# Patient Record
Sex: MALE | Race: WHITE | Employment: FULL TIME | URBAN - METROPOLITAN AREA
[De-identification: names, ages, dates, MRNs, and addresses within clinical notes are randomized per-mention and may not be internally consistent; named-entity substitution may affect disease eponyms.]

---

## 2017-08-01 ENCOUNTER — OFFICE VISIT (OUTPATIENT)
Dept: SURGERY | Facility: CLINIC | Age: 37
End: 2017-08-01

## 2017-08-01 ENCOUNTER — TELEPHONE (OUTPATIENT)
Dept: SURGERY | Facility: CLINIC | Age: 37
End: 2017-08-01

## 2017-08-01 VITALS
SYSTOLIC BLOOD PRESSURE: 130 MMHG | BODY MASS INDEX: 28.88 KG/M2 | HEIGHT: 74 IN | TEMPERATURE: 98 F | DIASTOLIC BLOOD PRESSURE: 84 MMHG | WEIGHT: 225 LBS | HEART RATE: 80 BPM

## 2017-08-01 DIAGNOSIS — Z30.09 VASECTOMY EVALUATION: Primary | ICD-10-CM

## 2017-08-01 DIAGNOSIS — R35.1 NOCTURIA: ICD-10-CM

## 2017-08-01 PROCEDURE — 99204 OFFICE O/P NEW MOD 45 MIN: CPT | Performed by: UROLOGY

## 2017-08-01 PROCEDURE — 99213 OFFICE O/P EST LOW 20 MIN: CPT | Performed by: UROLOGY

## 2017-08-01 RX ORDER — DIAZEPAM 5 MG/1
TABLET ORAL
Qty: 3 TABLET | Refills: 0 | Status: SHIPPED | OUTPATIENT
Start: 2017-08-01

## 2017-08-01 RX ORDER — CEFADROXIL 500 MG/1
CAPSULE ORAL
Qty: 10 CAPSULE | Refills: 0 | Status: SHIPPED | OUTPATIENT
Start: 2017-08-01

## 2017-08-01 RX ORDER — HYDROCODONE BITARTRATE AND ACETAMINOPHEN 5; 325 MG/1; MG/1
TABLET ORAL
Qty: 20 TABLET | Refills: 0 | Status: SHIPPED | OUTPATIENT
Start: 2017-08-01

## 2017-08-01 NOTE — TELEPHONE ENCOUNTER
Patient requesting surgery for vasectomy be performed at surgery center. Informed patient that Tucker Diaz, surgery scheduler, will check with insurance and if approved, will call patient to schedule.  Informed patient that if not approved at surgery center that

## 2017-08-01 NOTE — PATIENT INSTRUCTIONS
1.  Please continue  birth control precautions without exception, every time you have intercourse,  until further notice    2. Proceed with plans for voluntary sterilization -- bilateral vasectomy     3.   NO aspirin or NSAIDs (medications such as Advil, [facility],    please take the diazepam and hydrocodone 30 minutes before the actual start time of the procedure and not necessarily when you arrive at the surgery center.           12.  Lab tests in the near future   --urinalysis urine test tomorrow or sometim

## 2017-08-01 NOTE — PROGRESS NOTES
Delilah Zayas is a 43year old male. HPI:   History provided by pt. [facility] [place]     Patient is 43years old  and his wife is   39years old . They have 2 boys, 3and 11years old . This is the  only marriage for both of them. Quit date: 6/15/2006  Smokeless tobacco: Former User                     Alcohol use: Yes           0.0 oz/week     Comment: rarely       Medications (Active prior to today's visit):    Current Outpatient Prescriptions:  Cefadroxil 500 MG Oral Cap Oriented to time, place, person with normal affect  Exam appropriate for age; patellar reflexes 1/2 + bilaterally, symmetrical    Head/Face: normocephalic  Eyes/Vision: no scleral icterus  Neck/Thyroid: neck is supple without adenopathy  Lymphatic: no abno changing the national policy on vasectomy. The American U[employer] Association has come to the same conclusion.  I explained to him and he understands that he will be fertile immediately after the operation and could make his wife pregnant and has to bring i might be very difficult and may not be successful. I explained to him and he understands that complications such as wound infection and bleeding into the scrotum and longstanding postoperative testicular pain can occur lasting for years.   I have explained to use birth control precautions  at this point, and  also after the bilateral vasectomy until we tell him that it is safe to stop doing so. He understands and agrees.            2)  Prostate cancer screening/PSA screening  --   I strongly recommend to pat morning of the procedure; you may have them after the procedure    8. Please eat breakfast or lunch before the procedure because we will not be putting you to sleep, because otherwise the medications may cause nausea.     9.  no heavy lifting or strenuous procedure; you must have a . HYDROcodone-acetaminophen 5-325 MG Oral Tab 20 tablet 0      Sig: Take 2 tablets by mouth  WHEN YOU ARRIVE AT THE OFFICE   for your procedure; you must have a .   After the procedure, take 1 tablet every 4-6 raheel

## 2017-08-11 ENCOUNTER — APPOINTMENT (OUTPATIENT)
Dept: LAB | Facility: HOSPITAL | Age: 37
End: 2017-08-11
Attending: UROLOGY
Payer: COMMERCIAL

## 2017-08-11 DIAGNOSIS — R35.1 NOCTURIA: ICD-10-CM

## 2017-08-11 LAB
BILIRUB UR QL: NEGATIVE
CLARITY UR: CLEAR
COLOR UR: YELLOW
GLUCOSE UR-MCNC: NEGATIVE MG/DL
HGB UR QL STRIP.AUTO: NEGATIVE
KETONES UR-MCNC: NEGATIVE MG/DL
LEUKOCYTE ESTERASE UR QL STRIP.AUTO: NEGATIVE
NITRITE UR QL STRIP.AUTO: NEGATIVE
PH UR: 6 [PH] (ref 5–8)
PROT UR-MCNC: NEGATIVE MG/DL
SP GR UR STRIP: 1.02 (ref 1–1.03)
UROBILINOGEN UR STRIP-ACNC: <2
VIT C UR-MCNC: NEGATIVE MG/DL

## 2017-08-11 PROCEDURE — 81003 URINALYSIS AUTO W/O SCOPE: CPT

## 2021-12-13 ENCOUNTER — IMMUNIZATION (OUTPATIENT)
Dept: LAB | Facility: HOSPITAL | Age: 41
End: 2021-12-13
Attending: EMERGENCY MEDICINE
Payer: COMMERCIAL

## 2021-12-13 DIAGNOSIS — Z23 NEED FOR VACCINATION: Primary | ICD-10-CM

## 2021-12-13 PROCEDURE — 0004A SARSCOV2 VAC 30MCG/0.3ML IM: CPT

## 2023-01-28 ENCOUNTER — OFFICE VISIT (OUTPATIENT)
Dept: FAMILY MEDICINE CLINIC | Facility: CLINIC | Age: 43
End: 2023-01-28
Payer: COMMERCIAL

## 2023-01-28 VITALS
HEART RATE: 92 BPM | WEIGHT: 253.38 LBS | DIASTOLIC BLOOD PRESSURE: 94 MMHG | SYSTOLIC BLOOD PRESSURE: 132 MMHG | HEIGHT: 74 IN | TEMPERATURE: 98 F | OXYGEN SATURATION: 97 % | BODY MASS INDEX: 32.52 KG/M2 | RESPIRATION RATE: 14 BRPM

## 2023-01-28 DIAGNOSIS — J98.01 ACUTE BRONCHOSPASM: Primary | ICD-10-CM

## 2023-01-28 DIAGNOSIS — R03.0 ELEVATED BLOOD PRESSURE READING: ICD-10-CM

## 2023-01-28 LAB
OPERATOR ID: NORMAL
POCT LOT NUMBER: NORMAL
RAPID SARS-COV-2 BY PCR: NOT DETECTED

## 2023-01-28 PROCEDURE — 99214 OFFICE O/P EST MOD 30 MIN: CPT | Performed by: PHYSICIAN ASSISTANT

## 2023-01-28 PROCEDURE — 3008F BODY MASS INDEX DOCD: CPT | Performed by: PHYSICIAN ASSISTANT

## 2023-01-28 PROCEDURE — 94640 AIRWAY INHALATION TREATMENT: CPT | Performed by: PHYSICIAN ASSISTANT

## 2023-01-28 PROCEDURE — U0002 COVID-19 LAB TEST NON-CDC: HCPCS | Performed by: PHYSICIAN ASSISTANT

## 2023-01-28 PROCEDURE — 3075F SYST BP GE 130 - 139MM HG: CPT | Performed by: PHYSICIAN ASSISTANT

## 2023-01-28 PROCEDURE — 3080F DIAST BP >= 90 MM HG: CPT | Performed by: PHYSICIAN ASSISTANT

## 2023-01-28 RX ORDER — ALBUTEROL SULFATE 90 UG/1
2 AEROSOL, METERED RESPIRATORY (INHALATION) EVERY 6 HOURS
Qty: 1 EACH | Refills: 1 | Status: SHIPPED | OUTPATIENT
Start: 2023-01-28

## 2023-01-28 RX ORDER — PREDNISONE 20 MG/1
40 TABLET ORAL DAILY
Qty: 10 TABLET | Refills: 0 | Status: SHIPPED | OUTPATIENT
Start: 2023-01-28 | End: 2023-02-02

## 2023-01-28 RX ORDER — IPRATROPIUM BROMIDE AND ALBUTEROL SULFATE 2.5; .5 MG/3ML; MG/3ML
3 SOLUTION RESPIRATORY (INHALATION) ONCE
Status: COMPLETED | OUTPATIENT
Start: 2023-01-28 | End: 2023-01-28

## 2023-01-28 RX ADMIN — IPRATROPIUM BROMIDE AND ALBUTEROL SULFATE 3 ML: 2.5; .5 SOLUTION RESPIRATORY (INHALATION) at 15:52:00

## 2023-04-01 ENCOUNTER — OFFICE VISIT (OUTPATIENT)
Dept: FAMILY MEDICINE CLINIC | Facility: CLINIC | Age: 43
End: 2023-04-01
Payer: COMMERCIAL

## 2023-04-01 VITALS
HEIGHT: 74 IN | BODY MASS INDEX: 31.91 KG/M2 | RESPIRATION RATE: 16 BRPM | DIASTOLIC BLOOD PRESSURE: 84 MMHG | WEIGHT: 248.63 LBS | HEART RATE: 88 BPM | TEMPERATURE: 98 F | SYSTOLIC BLOOD PRESSURE: 142 MMHG

## 2023-04-01 DIAGNOSIS — J98.01 ACUTE BRONCHOSPASM: Primary | ICD-10-CM

## 2023-04-01 DIAGNOSIS — I10 ESSENTIAL HYPERTENSION: ICD-10-CM

## 2023-04-01 LAB
OPERATOR ID: NORMAL
POCT LOT NUMBER: NORMAL
RAPID SARS-COV-2 BY PCR: NOT DETECTED

## 2023-04-01 PROCEDURE — U0002 COVID-19 LAB TEST NON-CDC: HCPCS | Performed by: PHYSICIAN ASSISTANT

## 2023-04-01 PROCEDURE — 3079F DIAST BP 80-89 MM HG: CPT | Performed by: PHYSICIAN ASSISTANT

## 2023-04-01 PROCEDURE — 3008F BODY MASS INDEX DOCD: CPT | Performed by: PHYSICIAN ASSISTANT

## 2023-04-01 PROCEDURE — 99214 OFFICE O/P EST MOD 30 MIN: CPT | Performed by: PHYSICIAN ASSISTANT

## 2023-04-01 PROCEDURE — 94640 AIRWAY INHALATION TREATMENT: CPT | Performed by: PHYSICIAN ASSISTANT

## 2023-04-01 PROCEDURE — 3077F SYST BP >= 140 MM HG: CPT | Performed by: PHYSICIAN ASSISTANT

## 2023-04-01 RX ORDER — PREDNISONE 20 MG/1
40 TABLET ORAL DAILY
Qty: 10 TABLET | Refills: 0 | Status: SHIPPED | OUTPATIENT
Start: 2023-04-01 | End: 2023-04-06

## 2023-04-01 RX ORDER — IPRATROPIUM BROMIDE AND ALBUTEROL SULFATE 2.5; .5 MG/3ML; MG/3ML
3 SOLUTION RESPIRATORY (INHALATION) ONCE
Status: COMPLETED | OUTPATIENT
Start: 2023-04-01 | End: 2023-04-01

## 2023-04-01 RX ORDER — ALBUTEROL SULFATE 90 UG/1
2 AEROSOL, METERED RESPIRATORY (INHALATION) EVERY 4 HOURS PRN
Qty: 1 EACH | Refills: 0 | Status: SHIPPED | OUTPATIENT
Start: 2023-04-01

## 2023-04-01 RX ADMIN — IPRATROPIUM BROMIDE AND ALBUTEROL SULFATE 3 ML: 2.5; .5 SOLUTION RESPIRATORY (INHALATION) at 10:38:00

## 2023-04-01 NOTE — PATIENT INSTRUCTIONS
Please see your PCP or establish care with an [facility] Provider in the next 2 weeks

## 2023-05-01 ENCOUNTER — OFFICE VISIT (OUTPATIENT)
Dept: INTERNAL MEDICINE CLINIC | Facility: CLINIC | Age: 43
End: 2023-05-01

## 2023-05-01 ENCOUNTER — APPOINTMENT (OUTPATIENT)
Dept: GENERAL RADIOLOGY | Facility: HOSPITAL | Age: 43
End: 2023-05-01
Attending: EMERGENCY MEDICINE
Payer: COMMERCIAL

## 2023-05-01 ENCOUNTER — HOSPITAL ENCOUNTER (EMERGENCY)
Facility: HOSPITAL | Age: 43
Discharge: HOME | End: 2023-05-01
Attending: EMERGENCY MEDICINE
Payer: COMMERCIAL

## 2023-05-01 VITALS
BODY MASS INDEX: 31.83 KG/M2 | DIASTOLIC BLOOD PRESSURE: 90 MMHG | WEIGHT: 248 LBS | SYSTOLIC BLOOD PRESSURE: 147 MMHG | HEART RATE: 90 BPM | RESPIRATION RATE: 18 BRPM | TEMPERATURE: 98 F | OXYGEN SATURATION: 91 % | HEIGHT: 74 IN

## 2023-05-01 VITALS
DIASTOLIC BLOOD PRESSURE: 80 MMHG | HEART RATE: 86 BPM | SYSTOLIC BLOOD PRESSURE: 135 MMHG | BODY MASS INDEX: 31.83 KG/M2 | TEMPERATURE: 97 F | HEIGHT: 74 IN | OXYGEN SATURATION: 94 % | WEIGHT: 248 LBS

## 2023-05-01 DIAGNOSIS — J45.21 MILD INTERMITTENT ASTHMA WITH ACUTE EXACERBATION: Primary | ICD-10-CM

## 2023-05-01 DIAGNOSIS — J45.901 EXACERBATION OF ASTHMA, UNSPECIFIED ASTHMA SEVERITY, UNSPECIFIED WHETHER PERSISTENT: Primary | ICD-10-CM

## 2023-05-01 LAB
ALBUMIN SERPL-MCNC: 4.2 G/DL (ref 3.4–5)
ALBUMIN/GLOB SERPL: 1.1 {RATIO} (ref 1–2)
ALP LIVER SERPL-CCNC: 65 U/L
ALT SERPL-CCNC: 42 U/L
ANION GAP SERPL CALC-SCNC: 10 MMOL/L (ref 0–18)
AST SERPL-CCNC: 29 U/L (ref 15–37)
ATRIAL RATE: 87 BPM
BASOPHILS # BLD AUTO: 0.06 X10(3) UL (ref 0–0.2)
BASOPHILS NFR BLD AUTO: 0.8 %
BILIRUB SERPL-MCNC: 1 MG/DL (ref 0.1–2)
BUN BLD-MCNC: 12 MG/DL (ref 7–18)
BUN/CREAT SERPL: 11.5 (ref 10–20)
CALCIUM BLD-MCNC: 9.3 MG/DL (ref 8.5–10.1)
CHLORIDE SERPL-SCNC: 106 MMOL/L (ref 98–112)
CO2 SERPL-SCNC: 24 MMOL/L (ref 21–32)
CREAT BLD-MCNC: 1.04 MG/DL
DEPRECATED RDW RBC AUTO: 40.6 FL (ref 35.1–46.3)
EOSINOPHIL # BLD AUTO: 0.71 X10(3) UL (ref 0–0.7)
EOSINOPHIL NFR BLD AUTO: 9.1 %
ERYTHROCYTE [DISTWIDTH] IN BLOOD BY AUTOMATED COUNT: 12.5 % (ref 11–15)
GFR SERPLBLD BASED ON 1.73 SQ M-ARVRAT: 91 ML/MIN/1.73M2 (ref 60–?)
GLOBULIN PLAS-MCNC: 3.8 G/DL (ref 2.8–4.4)
GLUCOSE BLD-MCNC: 108 MG/DL (ref 70–99)
HCT VFR BLD AUTO: 48.5 %
HGB BLD-MCNC: 17.2 G/DL
IMM GRANULOCYTES # BLD AUTO: 0.03 X10(3) UL (ref 0–1)
IMM GRANULOCYTES NFR BLD: 0.4 %
LYMPHOCYTES # BLD AUTO: 2.48 X10(3) UL (ref 1–4)
LYMPHOCYTES NFR BLD AUTO: 31.7 %
MCH RBC QN AUTO: 31.3 PG (ref 26–34)
MCHC RBC AUTO-ENTMCNC: 35.5 G/DL (ref 31–37)
MCV RBC AUTO: 88.3 FL
MONOCYTES # BLD AUTO: 0.62 X10(3) UL (ref 0.1–1)
MONOCYTES NFR BLD AUTO: 7.9 %
NEUTROPHILS # BLD AUTO: 3.92 X10 (3) UL (ref 1.5–7.7)
NEUTROPHILS # BLD AUTO: 3.92 X10(3) UL (ref 1.5–7.7)
NEUTROPHILS NFR BLD AUTO: 50.1 %
OSMOLALITY SERPL CALC.SUM OF ELEC: 290 MOSM/KG (ref 275–295)
P AXIS: 65 DEGREES
P-R INTERVAL: 140 MS
PLATELET # BLD AUTO: 297 10(3)UL (ref 150–450)
POTASSIUM SERPL-SCNC: 3.9 MMOL/L (ref 3.5–5.1)
PROT SERPL-MCNC: 8 G/DL (ref 6.4–8.2)
Q-T INTERVAL: 358 MS
QRS DURATION: 64 MS
QTC CALCULATION (BEZET): 430 MS
R AXIS: 8 DEGREES
RBC # BLD AUTO: 5.49 X10(6)UL
SODIUM SERPL-SCNC: 140 MMOL/L (ref 136–145)
T AXIS: 54 DEGREES
VENTRICULAR RATE: 87 BPM
WBC # BLD AUTO: 7.8 X10(3) UL (ref 4–11)

## 2023-05-01 PROCEDURE — 93005 ELECTROCARDIOGRAM TRACING: CPT

## 2023-05-01 PROCEDURE — 99284 EMERGENCY DEPT VISIT MOD MDM: CPT

## 2023-05-01 PROCEDURE — 99214 OFFICE O/P EST MOD 30 MIN: CPT | Performed by: INTERNAL MEDICINE

## 2023-05-01 PROCEDURE — 80053 COMPREHEN METABOLIC PANEL: CPT | Performed by: EMERGENCY MEDICINE

## 2023-05-01 PROCEDURE — 36415 COLL VENOUS BLD VENIPUNCTURE: CPT

## 2023-05-01 PROCEDURE — 85025 COMPLETE CBC W/AUTO DIFF WBC: CPT

## 2023-05-01 PROCEDURE — 99285 EMERGENCY DEPT VISIT HI MDM: CPT

## 2023-05-01 PROCEDURE — 71045 X-RAY EXAM CHEST 1 VIEW: CPT | Performed by: EMERGENCY MEDICINE

## 2023-05-01 PROCEDURE — 93010 ELECTROCARDIOGRAM REPORT: CPT

## 2023-05-01 PROCEDURE — 80053 COMPREHEN METABOLIC PANEL: CPT

## 2023-05-01 PROCEDURE — 85025 COMPLETE CBC W/AUTO DIFF WBC: CPT | Performed by: EMERGENCY MEDICINE

## 2023-05-01 PROCEDURE — 94640 AIRWAY INHALATION TREATMENT: CPT

## 2023-05-01 RX ORDER — PREDNISONE 20 MG/1
TABLET ORAL
Qty: 12 TABLET | Refills: 0 | Status: SHIPPED | OUTPATIENT
Start: 2023-05-01

## 2023-05-01 RX ORDER — ALBUTEROL SULFATE 2.5 MG/3ML
SOLUTION RESPIRATORY (INHALATION)
Status: COMPLETED
Start: 2023-05-01 | End: 2023-05-01

## 2023-05-01 RX ORDER — PREDNISONE 20 MG/1
60 TABLET ORAL ONCE
Status: COMPLETED | OUTPATIENT
Start: 2023-05-01 | End: 2023-05-01

## 2023-05-01 RX ORDER — IPRATROPIUM BROMIDE AND ALBUTEROL SULFATE 2.5; .5 MG/3ML; MG/3ML
3 SOLUTION RESPIRATORY (INHALATION) ONCE AS NEEDED
Status: DISCONTINUED | OUTPATIENT
Start: 2023-05-01 | End: 2023-05-01

## 2023-05-01 RX ORDER — ALBUTEROL SULFATE 2.5 MG/3ML
2.5 SOLUTION RESPIRATORY (INHALATION) ONCE
Status: COMPLETED | OUTPATIENT
Start: 2023-05-01 | End: 2023-05-01

## 2023-05-01 RX ORDER — METHYLPREDNISOLONE SODIUM SUCCINATE 125 MG/2ML
125 INJECTION, POWDER, LYOPHILIZED, FOR SOLUTION INTRAMUSCULAR; INTRAVENOUS ONCE AS NEEDED
Status: DISCONTINUED | OUTPATIENT
Start: 2023-05-01 | End: 2023-05-01

## 2023-05-01 RX ORDER — ALBUTEROL SULFATE 90 UG/1
2 AEROSOL, METERED RESPIRATORY (INHALATION) EVERY 4 HOURS PRN
Qty: 1 EACH | Refills: 0 | Status: SHIPPED | OUTPATIENT
Start: 2023-05-01 | End: 2023-05-31

## 2023-05-01 NOTE — ED QUICK NOTES
Pt arrived via triage for ANNIA since yesterday  Reports known asthma exacerbation and recent nonproductive cough

## 2023-05-01 NOTE — DISCHARGE INSTRUCTIONS
Take prednisone as prescribed beginning tomorrow (5/2). Continue using your inhaler as needed. Follow-up with your primary physician for reevaluation.   Return to the emergency department if increasing difficulty breathing, fever, or other new symptoms develop

## 2023-05-01 NOTE — ED INITIAL ASSESSMENT (HPI)
Pt ambulatory to ED A&O x 4 w/ c/o: ANNIA, symptoms began last night. Pt w/ hx of asthma, taking Albuterol inhaler at home w/ out relief. Pt denies chest pain, cough, fever/chills. (+) wheezing noted.

## 2023-07-10 ENCOUNTER — PATIENT MESSAGE (OUTPATIENT)
Dept: INTERNAL MEDICINE CLINIC | Facility: CLINIC | Age: 43
End: 2023-07-10

## 2023-07-10 RX ORDER — ALBUTEROL SULFATE 90 UG/1
2 AEROSOL, METERED RESPIRATORY (INHALATION) EVERY 4 HOURS PRN
Qty: 1 EACH | Refills: 0 | Status: CANCELLED | OUTPATIENT
Start: 2023-07-10

## 2023-07-10 NOTE — TELEPHONE ENCOUNTER
Routing to protocol. From: Eugene Olivo  To: Lynn Li MD  Sent: 7/10/2023  6:47 AM CDT  Subject: Follow Up Appointment    Dr. Jacquie Guajardo, I went to schedule a follow up appointment to the last time I saw you, when you sent me to the ER for my asthma, but noticed you didn't have anything until next month. I need to get a prescription for an albuterol inhaler called in, but seeing that you don't have appointments until August, is there another provider I should contact, or would you be able to refresh my prescription? Please let me know, thank you. Eugene Olivo [phone].

## 2023-07-10 NOTE — TELEPHONE ENCOUNTER
Dr Elin Alexander =prescribed by different provider [facility] JULIAN ) . Pended for approval.     RN sent [employer] message to schedule for an appointment . CSS=please call and assists. No future appointments. Refill passed per [facility] protocol. Requested Prescriptions   Pending Prescriptions Disp Refills    albuterol 108 (90 Base) MCG/ACT Inhalation Aero Soln 1 each 0     Sig: Inhale 2 puffs into the lungs every 4 (four) hours as needed for Wheezing or Shortness of Breath.        Asthma & COPD Medication Protocol Passed - 7/10/2023  4:05 PM        Passed - In person appointment or virtual visit in the past 6 mos or appointment in next 3 mos     Recent Outpatient Visits              2 months ago Mild intermittent asthma with acute exacerbation    [facility], [address] [facility],[address], [place] Vazquez MD    Office Visit    3 months ago Acute bronchospasm    [facility], [address], [address], [place], [place], [employer]    Office Visit    5 months ago Acute bronchospasm    [facility], [address], [address], [place], [place], [employer]    Office Visit    5 years ago Vasectomy evaluation    lio [facility], [address], [place] Becka Oakley MD    Office Visit    7 years ago Pneumonia of left lower lobe due to infectious organism    [facility]an [facility], [address], [place] Justin Hoang MD    Office Visit                                 Recent Outpatient Visits              2 months ago Mild intermittent asthma with acute exacerbation    [address] [facility], [place]    Office Visit    3 months ago Acute bronchospasm    [facility], [address], [address], [place], [place], [employer]    Office Visit    5 months ago Acute bronchospasm    Esteban Austin, [address], [place], [place], [employer]    Office Visit    5 years ago Vasectomy evaluation    Ryley Dhillon MD    Office Visit    7 years ago Pneumonia of left lower lobe due to infectious organism    Ryley Dhillon Reymundo Inch, MD    Office Visit

## 2023-07-12 RX ORDER — ALBUTEROL SULFATE 90 UG/1
2 AEROSOL, METERED RESPIRATORY (INHALATION) EVERY 4 HOURS PRN
Qty: 1 EACH | Refills: 0 | Status: SHIPPED | OUTPATIENT
Start: 2023-07-12 | End: 2023-07-12

## 2023-07-12 RX ORDER — ALBUTEROL SULFATE 90 UG/1
2 AEROSOL, METERED RESPIRATORY (INHALATION) EVERY 4 HOURS PRN
Qty: 1 EACH | Refills: 0 | Status: SHIPPED | OUTPATIENT
Start: 2023-07-12

## 2023-08-14 ENCOUNTER — OFFICE VISIT (OUTPATIENT)
Dept: INTERNAL MEDICINE CLINIC | Facility: CLINIC | Age: 43
End: 2023-08-14

## 2023-08-14 VITALS
DIASTOLIC BLOOD PRESSURE: 90 MMHG | BODY MASS INDEX: 32.73 KG/M2 | SYSTOLIC BLOOD PRESSURE: 150 MMHG | WEIGHT: 255 LBS | HEART RATE: 92 BPM | OXYGEN SATURATION: 95 % | HEIGHT: 74 IN | TEMPERATURE: 97 F

## 2023-08-14 DIAGNOSIS — J45.20 MILD INTERMITTENT ASTHMA WITHOUT COMPLICATION: Primary | ICD-10-CM

## 2023-08-14 PROCEDURE — 3008F BODY MASS INDEX DOCD: CPT | Performed by: INTERNAL MEDICINE

## 2023-08-14 PROCEDURE — 3080F DIAST BP >= 90 MM HG: CPT | Performed by: INTERNAL MEDICINE

## 2023-08-14 PROCEDURE — 99214 OFFICE O/P EST MOD 30 MIN: CPT | Performed by: INTERNAL MEDICINE

## 2023-08-14 PROCEDURE — 3077F SYST BP >= 140 MM HG: CPT | Performed by: INTERNAL MEDICINE

## 2023-08-14 RX ORDER — ALBUTEROL SULFATE 90 UG/1
2 AEROSOL, METERED RESPIRATORY (INHALATION) EVERY 4 HOURS PRN
Qty: 1 EACH | Refills: 0 | Status: SHIPPED | OUTPATIENT
Start: 2023-08-14

## 2023-08-14 NOTE — PROGRESS NOTES
Subjective:     Patient ID: Felipa Mauricio is a 38year old male. Asthma  His past medical history is significant for asthma. History/Other:   He came in today for follow-up on his asthma. [place] patient is still has wheezing on and off he is using albuterol inhaler 3 times a week. Normal shortness of breath during the night. He denies any cough. He has no history of high blood pressure but he states that couple of occasion his blood pressure was high  Review of Systems   Constitutional: Negative. HENT: Negative. Eyes: Negative. Respiratory: Negative. Cardiovascular: Negative. Gastrointestinal: Negative. Genitourinary: Negative. Musculoskeletal: Negative. Neurological: Negative. Hematological: Negative. Psychiatric/Behavioral: Negative. Current Outpatient Medications   Medication Sig Dispense Refill    albuterol 108 (90 Base) MCG/ACT Inhalation Aero Soln Inhale 2 puffs into the lungs every 4 (four) hours as needed for Wheezing or Shortness of Breath. 1 each 0     Allergies:  Food [Tree Nuts]        OTHER (SEE COMMENTS)    Comment:vomiting    Past Medical History:   Diagnosis Date    Anemia     Asthma     Esophageal reflux       History reviewed. No pertinent surgical history. Family History   Problem Relation Age of Onset    Lipids Father     Other (Other) Father         Chu's esophagus    Cancer Sister         brain      Social History:   Social History     Socioeconomic History    Marital status:    Tobacco Use    Smoking status: Former     Types: Cigarettes     Quit date: 6/15/2006     Years since quittin.1    Smokeless tobacco: Former   Vaping Use    Vaping Use: Never used   Substance and Sexual Activity    Alcohol use: Not Currently     Comment: 3x per week    Drug use: No   Other Topics Concern    Caffeine Concern Yes     Comment: Coffee 2 cups daily        Objective:   Physical Exam  Vitals and nursing note reviewed.    Constitutional: Appearance: Normal appearance. HENT:      Head: Normocephalic and atraumatic. Cardiovascular:      Rate and Rhythm: Normal rate and regular rhythm. Pulses: Normal pulses. Heart sounds: Normal heart sounds. Pulmonary:      Effort: Pulmonary effort is normal.      Breath sounds: Normal breath sounds. Abdominal:      Palpations: Abdomen is soft. Musculoskeletal:         General: Normal range of motion. Cervical back: Normal range of motion and neck supple. Skin:     General: Skin is warm. Neurological:      Mental Status: He is alert. Mental status is at baseline. Psychiatric:         Mood and Affect: Mood normal.         Assessment & Plan:   No diagnosis found. Mild intermittent asthma continue with inhaler as needed, can take also over-the-counter antihistamine    Blood pressure no history of hypertension monitor blood pressure at home and follow-up in 3 weeks for bp check , avoid salty food  No orders of the defined types were placed in this encounter. Meds This Visit:  Requested Prescriptions     Signed Prescriptions Disp Refills    albuterol 108 (90 Base) MCG/ACT Inhalation Aero Soln 1 each 0     Sig: Inhale 2 puffs into the lungs every 4 (four) hours as needed for Wheezing or Shortness of Breath.        Imaging & Referrals:  None

## 2023-08-17 ENCOUNTER — TELEPHONE (OUTPATIENT)
Dept: INTERNAL MEDICINE CLINIC | Facility: CLINIC | Age: 43
End: 2023-08-17

## 2023-09-18 ENCOUNTER — OFFICE VISIT (OUTPATIENT)
Dept: INTERNAL MEDICINE CLINIC | Facility: CLINIC | Age: 43
End: 2023-09-18

## 2023-09-18 VITALS
WEIGHT: 259 LBS | SYSTOLIC BLOOD PRESSURE: 145 MMHG | OXYGEN SATURATION: 96 % | DIASTOLIC BLOOD PRESSURE: 85 MMHG | BODY MASS INDEX: 33 KG/M2 | TEMPERATURE: 98 F | HEART RATE: 86 BPM

## 2023-09-18 DIAGNOSIS — I10 PRIMARY HYPERTENSION: Primary | ICD-10-CM

## 2023-09-18 PROCEDURE — 3077F SYST BP >= 140 MM HG: CPT | Performed by: INTERNAL MEDICINE

## 2023-09-18 PROCEDURE — 99213 OFFICE O/P EST LOW 20 MIN: CPT | Performed by: INTERNAL MEDICINE

## 2023-09-18 PROCEDURE — 3079F DIAST BP 80-89 MM HG: CPT | Performed by: INTERNAL MEDICINE

## 2023-09-18 RX ORDER — ALBUTEROL SULFATE 90 UG/1
2 AEROSOL, METERED RESPIRATORY (INHALATION) EVERY 4 HOURS PRN
Qty: 1 EACH | Refills: 0 | Status: SHIPPED | OUTPATIENT
Start: 2023-09-18

## 2023-09-18 RX ORDER — FLUTICASONE PROPIONATE AND SALMETEROL 100; 50 UG/1; UG/1
1 POWDER RESPIRATORY (INHALATION) 2 TIMES DAILY
Qty: 1 EACH | Refills: 0 | Status: SHIPPED | OUTPATIENT
Start: 2023-09-18

## 2023-09-18 RX ORDER — AMLODIPINE BESYLATE 10 MG/1
10 TABLET ORAL DAILY
Qty: 90 TABLET | Refills: 0 | Status: SHIPPED | OUTPATIENT
Start: 2023-09-18

## 2023-10-14 ENCOUNTER — APPOINTMENT (OUTPATIENT)
Dept: GENERAL RADIOLOGY | Facility: HOSPITAL | Age: 43
End: 2023-10-14
Attending: EMERGENCY MEDICINE
Payer: COMMERCIAL

## 2023-10-14 ENCOUNTER — HOSPITAL ENCOUNTER (EMERGENCY)
Facility: HOSPITAL | Age: 43
Discharge: HOME | End: 2023-10-14
Attending: EMERGENCY MEDICINE
Payer: COMMERCIAL

## 2023-10-14 VITALS
SYSTOLIC BLOOD PRESSURE: 139 MMHG | RESPIRATION RATE: 22 BRPM | OXYGEN SATURATION: 95 % | TEMPERATURE: 98 F | WEIGHT: 255 LBS | HEART RATE: 109 BPM | HEIGHT: 74 IN | BODY MASS INDEX: 32.73 KG/M2 | DIASTOLIC BLOOD PRESSURE: 84 MMHG

## 2023-10-14 DIAGNOSIS — J45.901 MILD ASTHMA WITH EXACERBATION, UNSPECIFIED WHETHER PERSISTENT: ICD-10-CM

## 2023-10-14 DIAGNOSIS — J11.1 INFLUENZA: Primary | ICD-10-CM

## 2023-10-14 LAB
ATRIAL RATE: 110 BPM
FLUAV + FLUBV RNA SPEC NAA+PROBE: NEGATIVE
FLUAV + FLUBV RNA SPEC NAA+PROBE: POSITIVE
P AXIS: 61 DEGREES
P-R INTERVAL: 146 MS
Q-T INTERVAL: 328 MS
QRS DURATION: 84 MS
QTC CALCULATION (BEZET): 443 MS
R AXIS: -5 DEGREES
RSV RNA SPEC NAA+PROBE: NEGATIVE
SARS-COV-2 RNA RESP QL NAA+PROBE: NOT DETECTED
T AXIS: 64 DEGREES
VENTRICULAR RATE: 110 BPM

## 2023-10-14 PROCEDURE — 0241U SARS-COV-2/FLU A AND B/RSV BY PCR (GENEXPERT): CPT | Performed by: EMERGENCY MEDICINE

## 2023-10-14 PROCEDURE — 71045 X-RAY EXAM CHEST 1 VIEW: CPT | Performed by: EMERGENCY MEDICINE

## 2023-10-14 PROCEDURE — 99284 EMERGENCY DEPT VISIT MOD MDM: CPT

## 2023-10-14 PROCEDURE — 93010 ELECTROCARDIOGRAM REPORT: CPT

## 2023-10-14 PROCEDURE — 94644 CONT INHLJ TX 1ST HOUR: CPT

## 2023-10-14 PROCEDURE — 93005 ELECTROCARDIOGRAM TRACING: CPT

## 2023-10-14 RX ORDER — PREDNISONE 20 MG/1
60 TABLET ORAL DAILY
Qty: 12 TABLET | Refills: 0 | Status: SHIPPED | OUTPATIENT
Start: 2023-10-14 | End: 2023-10-18

## 2023-10-14 RX ORDER — IPRATROPIUM BROMIDE AND ALBUTEROL SULFATE 2.5; .5 MG/3ML; MG/3ML
3 SOLUTION RESPIRATORY (INHALATION) ONCE
Status: DISCONTINUED | OUTPATIENT
Start: 2023-10-14 | End: 2023-10-14

## 2023-10-14 RX ORDER — PREDNISONE 20 MG/1
60 TABLET ORAL ONCE
Status: COMPLETED | OUTPATIENT
Start: 2023-10-14 | End: 2023-10-14

## 2023-11-20 RX ORDER — ALBUTEROL SULFATE 90 UG/1
2 AEROSOL, METERED RESPIRATORY (INHALATION) EVERY 4 HOURS PRN
Qty: 1 EACH | Refills: 0 | Status: SHIPPED | OUTPATIENT
Start: 2023-11-20

## 2023-11-20 RX ORDER — FLUTICASONE PROPIONATE AND SALMETEROL 100; 50 UG/1; UG/1
1 POWDER RESPIRATORY (INHALATION) 2 TIMES DAILY
Qty: 1 EACH | Refills: 3 | Status: SHIPPED | OUTPATIENT
Start: 2023-11-20

## 2023-11-20 NOTE — TELEPHONE ENCOUNTER
Refill passed per [facility] protocol. Requested Prescriptions   Pending Prescriptions Disp Refills    albuterol 108 (90 Base) MCG/ACT Inhalation Aero Soln 1 each 0     Sig: Inhale 2 puffs into the lungs every 4 (four) hours as needed for Wheezing or Shortness of Breath. Asthma & COPD Medication Protocol Passed - 11/17/2023  3:02 PM        Passed - In person appointment or virtual visit in the past 6 mos or appointment in next 3 mos     Recent Outpatient Visits              2 months ago Primary hypertension    Jann Suarez MD    Office Visit    3 months ago Mild intermittent asthma without complication    [facility], [address], [place]    Office Visit    6 months ago Mild intermittent asthma with acute exacerbation    [facility], [address], [place] Vazquez MD    Office Visit    7 months ago Acute bronchospasm    [facility], [address], [address], [place], [facility]    Office Visit    9 months ago Acute bronchospasm    [address], [place], [place], PA-C    Office Visit          Future Appointments         Provider Department Appt Notes    In 1 week MD Torri Noonan, [address], [place] Review blood pressure results from new medication                 fluticasone-salmeterol 100-50 MCG/ACT Inhalation Aerosol Powder, Breath Activated 1 each 0     Sig: Inhale 1 puff into the lungs 2 (two) times daily.        Asthma & COPD Medication Protocol Passed - 11/17/2023  3:02 PM        Passed - In person appointment or virtual visit in the past 6 mos or appointment in next 3 mos     Recent Outpatient Visits              2 months ago Primary hypertension    Jann Suarez MD    Office Visit    3 months ago Mild intermittent asthma without complication    George Wilson [place], MD    Office Visit    6 months ago Mild intermittent asthma with acute exacerbation    [facility], [address] [facility], Tato Vazquez MD    Office Visit    7 months ago Acute bronchospasm    [facility], [address], [address] [facility], [place], [place], [place]    Office Visit    9 months ago Acute bronchospasm    [facility], [address], [address], [place], [place], PA-C    Office Visit          Future Appointments         Provider Department Appt Notes    In 1 week Rhonda Gagnon MD [address], [address] [facility], [place] Review blood pressure results from new medication                  Recent Outpatient Visits              2 months ago Primary hypertension    Altagracia Wilson MD    Office Visit    3 months ago Mild intermittent asthma without complication    [address], [address] [facility],[address] [facility], Tato Vazquez MD    Office Visit    6 months ago Mild intermittent asthma with acute exacerbation    George Wilson [place], MD    Office Visit    7 months ago Acute bronchospasm    [facility], [address], [address] [facility],[address] [facility], [place], [place], [place]    Office Visit    9 months ago Acute bronchospasm    [address], [place], [place], PA-C    Office Visit          Future Appointments         Provider Department Appt Notes    In 1 week MD Anthony Aragon [place] Review blood pressure results from new medication

## 2023-11-27 ENCOUNTER — OFFICE VISIT (OUTPATIENT)
Dept: INTERNAL MEDICINE CLINIC | Facility: CLINIC | Age: 43
End: 2023-11-27

## 2023-11-27 VITALS
TEMPERATURE: 97 F | WEIGHT: 260 LBS | DIASTOLIC BLOOD PRESSURE: 85 MMHG | BODY MASS INDEX: 33.37 KG/M2 | HEART RATE: 101 BPM | SYSTOLIC BLOOD PRESSURE: 135 MMHG | HEIGHT: 74 IN

## 2023-11-27 DIAGNOSIS — I10 PRIMARY HYPERTENSION: Primary | ICD-10-CM

## 2023-11-27 DIAGNOSIS — Z12.11 SCREENING FOR COLON CANCER: ICD-10-CM

## 2023-11-27 PROCEDURE — 3075F SYST BP GE 130 - 139MM HG: CPT | Performed by: INTERNAL MEDICINE

## 2023-11-27 PROCEDURE — 3079F DIAST BP 80-89 MM HG: CPT | Performed by: INTERNAL MEDICINE

## 2023-11-27 PROCEDURE — 90471 IMMUNIZATION ADMIN: CPT | Performed by: INTERNAL MEDICINE

## 2023-11-27 PROCEDURE — 99213 OFFICE O/P EST LOW 20 MIN: CPT | Performed by: INTERNAL MEDICINE

## 2023-11-27 PROCEDURE — 90686 IIV4 VACC NO PRSV 0.5 ML IM: CPT | Performed by: INTERNAL MEDICINE

## 2023-11-27 PROCEDURE — 3008F BODY MASS INDEX DOCD: CPT | Performed by: INTERNAL MEDICINE

## 2023-11-27 NOTE — PROGRESS NOTES
Subjective:   Patient ID: Eustace Kayser is a 37year old male. Hypertension        History/Other: He came in today for follow-up on his blood pressure. He states that he is taking his medications regularly and is monitoring his blood pressure for the last 2 weeks blood pressure has been under control ,it goes up to 140s. Review of Systems   Constitutional: Negative. HENT: Negative. Eyes: Negative. Respiratory: Negative. Cardiovascular: Negative. Gastrointestinal: Negative. Genitourinary: Negative. Musculoskeletal: Negative. Hematological: Negative. Psychiatric/Behavioral: Negative. Current Outpatient Medications   Medication Sig Dispense Refill    albuterol 108 (90 Base) MCG/ACT Inhalation Aero Soln Inhale 2 puffs into the lungs every 4 (four) hours as needed for Wheezing or Shortness of Breath. 1 each 0    fluticasone-salmeterol 100-50 MCG/ACT Inhalation Aerosol Powder, Breath Activated Inhale 1 puff into the lungs 2 (two) times daily. 1 each 3    amLODIPine 10 MG Oral Tab Take 1 tablet (10 mg total) by mouth daily. 90 tablet 0     Allergies: Allergies   Allergen Reactions    Food Gearldine Peguero Nuts] OTHER (SEE COMMENTS)     vomiting       Objective:   Physical Exam  Vitals and nursing note reviewed. Constitutional:       Appearance: Normal appearance. HENT:      Head: Normocephalic and atraumatic. Cardiovascular:      Rate and Rhythm: Normal rate and regular rhythm. Pulses: Normal pulses. Heart sounds: Normal heart sounds. Pulmonary:      Effort: Pulmonary effort is normal.      Breath sounds: Normal breath sounds. Musculoskeletal:         General: Normal range of motion. Cervical back: Normal range of motion and neck supple. Skin:     General: Skin is warm. Neurological:      Mental Status: He is alert. Assessment & Plan:   1.  Screening for colon cancer    Gi   2 essential blood pressure controlled continue current medication, monitor blood pressure at home and bring log  book next visit    No orders of the defined types were placed in this encounter.       Meds This Visit:  Requested Prescriptions      No prescriptions requested or ordered in this encounter       Imaging & Referrals:  OP REFERRAL TO [facility] GI TELEPHONE COLON SCREEN

## 2023-11-27 NOTE — PROGRESS NOTES
Subjective:     Patient ID: Gricel Alvarez is a 37year old male. Hypertension        History/Other:   Review of Systems  Current Outpatient Medications   Medication Sig Dispense Refill    albuterol 108 (90 Base) MCG/ACT Inhalation Aero Soln Inhale 2 puffs into the lungs every 4 (four) hours as needed for Wheezing or Shortness of Breath. 1 each 0    fluticasone-salmeterol 100-50 MCG/ACT Inhalation Aerosol Powder, Breath Activated Inhale 1 puff into the lungs 2 (two) times daily. 1 each 3    amLODIPine 10 MG Oral Tab Take 1 tablet (10 mg total) by mouth daily. 90 tablet 0     Allergies: Allergies   Allergen Reactions    Food Julia Isacc Nuts] OTHER (SEE COMMENTS)     vomiting       Past Medical History:   Diagnosis Date    Anemia     Asthma     Esophageal reflux     Essential hypertension       History reviewed. No pertinent surgical history. Family History   Problem Relation Age of Onset    Lipids Father     Other (Other) Father         Chu's esophagus    Cancer Sister         brain      Social History:   Social History     Socioeconomic History    Marital status:    Tobacco Use    Smoking status: Former     Types: Cigarettes     Quit date: 6/15/2006     Years since quittin.4    Smokeless tobacco: Former   Vaping Use    Vaping Use: Never used   Substance and Sexual Activity    Alcohol use: Yes     Comment: 3x per week    Drug use: No   Other Topics Concern    Caffeine Concern Yes     Comment: Coffee 2 cups daily        Objective:   Physical Exam    Assessment & Plan:   1. Screening for colon cancer        No orders of the defined types were placed in this encounter.       Meds This Visit:  Requested Prescriptions      No prescriptions requested or ordered in this encounter       Imaging & Referrals:  OP REFERRAL TO [facility] GI TELEPHONE COLON SCREEN

## 2024-01-03 RX ORDER — FLUTICASONE PROPIONATE AND SALMETEROL 100; 50 UG/1; UG/1
1 POWDER RESPIRATORY (INHALATION) 2 TIMES DAILY
Qty: 1 EACH | Refills: 3 | Status: SHIPPED | OUTPATIENT
Start: 2024-01-03

## 2024-01-03 RX ORDER — AMLODIPINE BESYLATE 10 MG/1
10 TABLET ORAL DAILY
Qty: 90 TABLET | Refills: 0 | Status: SHIPPED | OUTPATIENT
Start: 2024-01-03

## 2024-01-03 NOTE — TELEPHONE ENCOUNTER
Refill passed per [facility] protocol.    Requested Prescriptions   Pending Prescriptions Disp Refills    amLODIPine 10 MG Oral Tab 90 tablet 0     Sig: Take 1 tablet (10 mg total) by mouth daily.       Hypertensive Medications Protocol Failed - 1/2/2024  2:19 PM        Failed - CMP or BMP in past 6 months     No results found for this or any previous visit (from the past 4392 hour(s)).            Passed - In person appointment in the past 12 or next 3 months     Recent Outpatient Visits              [facility]    [facility]    3 months ago [facility]    [facility]    Office Visit    4 months ago Mild intermittent asthma without complication    [facility]    Office Visit    8 months ago Mild intermittent asthma with acute exacerbation    [facility]    Office Visit    9 months ago Acute bronchospasm    [facility] [place] [facility]    Office Visit                      Passed - Last BP reading less than 140/90     BP Readings from Last 1 Encounters:   11/27/23 135/85               Passed - In person appointment or virtual visit in the past 6 months     Recent Outpatient Visits              1 month ago [facility] hypertension    [facility]    [facility]    3 months ago [facility] hypertension    [facility]    Office Visit    4 months ago Mild intermittent asthma without complication    [facility]    Office Visit    8 months ago Mild intermittent asthma with acute exacerbation    [place]  [facility]    Office Visit    9 months ago Acute bronchospasm    [facility]    Office Visit                      Passed - EGFRCR or GFRNAA > 50     GFR Evaluation  EGFRCR: 91 , resulted on 5/1/2023            fluticasone-salmeterol 100-50 MCG/ACT Inhalation Aerosol Powder, Breath Activated 1 each 3     Sig: Inhale 1 puff into the lungs 2 (two) times daily.       Asthma & COPD Medication Protocol Passed - 1/2/2024  2:19 PM        Passed - In person appointment or virtual visit in the past 6 mos or appointment in next 3 mos     Recent Outpatient Visits              1 month ago [facility]    3 months [facility]    [facility]    [facility] Visit    4 months ago Mild intermittent asthma without complication    [facility]    [facility] Visit    8 months ago Mild intermittent asthma with acute exacerbation    [facility]    Office Visit    9 months ago Acute bronchospasm    [facility], [facility], [facility]    Office Visit                         Recent Outpatient Visits              1 month ago [facility] hypertension    [facility]    [facility]    3 months ago [facility] hypertension    [facility]    Office Visit    4 months ago Mild intermittent asthma without complication    [facility]    Office Visit    8 months ago Mild intermittent asthma with acute exacerbation    [place]  [facility], [facility], Mya Mao MD    Office Visit    9 months ago Acute bronchospasm    [facility], [facility], [facility], Deisy Morse PA-C    Office Visit

## 2024-01-03 NOTE — TELEPHONE ENCOUNTER
Please review; protocol failed/ has no protocol    No active /future labs noted     Requested Prescriptions   Pending Prescriptions Disp Refills    amLODIPine 10 MG Oral Tab 90 tablet 0     Sig: Take 1 tablet (10 mg total) by mouth daily.       Hypertensive Medications Protocol Failed - 1/2/2024  2:19 PM        Failed - CMP or BMP in past 6 months     No results found for this or any previous visit (from the past 4392 hour(s)).            Passed - In person appointment in the past 12 or next 3 months     Recent Outpatient Visits              [facility] ago [facility]    3 months [facility]    [facility]    4 months ago Mild intermittent asthma without complication    [facility]    [facility]    8 months ago Mild intermittent asthma with acute exacerbation    [facility]    [facility] Visit    9 months ago Acute bronchospasm    [facility]    [facility] Visit                      Passed - Last BP reading less than 140/90     BP Readings from Last 1 Encounters:   11/27/23 135/85               Passed - In person appointment or virtual visit in the past 6 months     Recent Outpatient Visits              1 month ago [facility] hypertension    [facility]    [facility]    3 months ago [facility]    [facility]    [facility] Visit    4 months ago Mild intermittent asthma without complication    [facility]    [facility]    8 months ago Mild intermittent asthma with  acute exacerbation    [facility]    [facility]    9 months ago Acute bronchospasm    [facility] [place], [facility], [facility] Deisy Morse PA-C    Office Visit                      Passed - EGFRCR or GFRNAA > 50     GFR Evaluation  EGFRCR: 91 , resulted on 5/1/2023           Signed Prescriptions Disp Refills    fluticasone-salmeterol 100-50 MCG/ACT Inhalation Aerosol Powder, Breath Activated 1 each 3     Sig: Inhale 1 puff into the lungs 2 (two) times daily.       Asthma & COPD Medication Protocol Passed - 1/2/2024  2:19 PM        Passed - In person appointment or virtual visit in the past 6 mos or appointment in next 3 mos     Recent Outpatient Visits              1 month ago Primary hypertension    [facility]    3 months ago Primary hypertension    [facility]    [facility] Visit    4 months ago Mild intermittent asthma without complication    [facility]    [facility] Visit    8 months ago Mild intermittent asthma with acute exacerbation    [facility]    [facility] Visit    9 months ago Acute bronchospasm    [facility] [place], [facility], [facility], Deisy Morse PA-C    Office Visit                         Recent Outpatient Visits              1 month ago Primary hypertension    [facility]    [facility]    3 months ago Primary hypertension    [facility]    [facility] Visit    4 months ago Mild intermittent asthma without complication    [facility]    [facility]    8  months ago Mild intermittent asthma with acute exacerbation    [facility], [facility] [place]    Office Visit    9 months ago Acute bronchospasm    [facility], [facility], [facility], Deisy Morse PA-C    Office Visit

## 2024-03-01 ENCOUNTER — TELEPHONE (OUTPATIENT)
Dept: INTERNAL MEDICINE CLINIC | Facility: CLINIC | Age: 44
End: 2024-03-01

## 2024-03-02 NOTE — TELEPHONE ENCOUNTER
Please review; protocol failed/no protocol.     Requested Prescriptions   Pending Prescriptions Disp Refills    albuterol 108 (90 Base) MCG/ACT Inhalation Aero Soln 1 each 0     Sig: Inhale 2 puffs into the lungs every 4 (four) hours as needed for Wheezing or Shortness of Breath.       Asthma & COPD Medication Protocol Failed - 2/29/2024 12:35 PM        Failed - Asthma Action Score greater than or equal to 20        Failed - AAP/ACT given in last 12 months     No data recorded  No data recorded  No data recorded  No data recorded          Passed - Appointment in past 6 or next 3 months      Recent Outpatient Visits              3 months ago Primary hypertension    [place], Mya Mao MD    Office Visit    5 months ago Primary hypertension    [place], Mya Mao MD    Office Visit    6 months ago Mild intermittent asthma without complication (HCC)    [place], Mya Mao MD    Office Visit    10 months ago Mild intermittent asthma with acute exacerbation (HCC)    [facility]    [facility]    11 months ago Acute bronchospasm    [facility], [facility], Deisy Morse PA-C    Office Visit                            Recent Outpatient Visits              3 months ago Primary hypertension    [place], Mya Mao MD    Office Visit    5 months ago [facility] hypertension    [facility]    [facility]    6 months ago Mild intermittent asthma without complication (HCC)    [facility] [facility]    Office Visit    10 months ago Mild intermittent asthma with acute exacerbation (HCC)    [facility]  [address], Mya Mao MD    Office Visit    11 months ago Acute bronchospasm    [facility], [facility], [facility], Deisy Morse PA-C    Office Visit

## 2024-03-04 ENCOUNTER — PATIENT OUTREACH (OUTPATIENT)
Dept: CASE MANAGEMENT | Age: 44
End: 2024-03-04

## 2024-03-04 RX ORDER — ALBUTEROL SULFATE 90 UG/1
2 AEROSOL, METERED RESPIRATORY (INHALATION) EVERY 4 HOURS PRN
Qty: 18 G | Refills: 3 | Status: SHIPPED | OUTPATIENT
Start: 2024-03-04

## 2024-03-04 NOTE — PROCEDURES
Received order requesting to update PCP to Mya Hussein is Approved and finalized on March 4, 2024.    Thanks,  [facility] Team

## 2024-04-02 RX ORDER — AMLODIPINE BESYLATE 10 MG/1
10 TABLET ORAL DAILY
Qty: 90 TABLET | Refills: 3 | Status: SHIPPED | OUTPATIENT
Start: 2024-04-02

## 2024-04-03 NOTE — TELEPHONE ENCOUNTER
Refill passed per [facility] protocol.    Requested Prescriptions   Pending Prescriptions Disp Refills    AMLODIPINE 10 MG Oral Tab [Pharmacy Med Name: AMLODIPINE BESYLATE 10 MG TAB] 90 tablet 0     Sig: TAKE 1 TABLET BY MOUTH EVERY DAY       Hypertension Medications Protocol Passed - 3/31/2024 12:23 AM        Passed - CMP or BMP in past 12 months        Passed - Last BP reading less than 140/90     BP Readings from Last 1 Encounters:   11/27/23 135/85               Passed - In person appointment or virtual visit in the past 12 mos or appointment in next 3 mos     Recent Outpatient Visits              4 months ago Primary hypertension    [facility]    [facility]    6 months ago Primary hypertension    [facility]    Office Visit    7 months ago Mild intermittent asthma without complication (HCC)    [facility] [place] [facility]    Office Visit    11 months ago Mild intermittent asthma with acute exacerbation (HCC)    [facility] [place] [facility]    Office Visit    1 year ago Acute bronchospasm    [facility]    Office Visit                      Passed - EGFRCR or GFRNAA > 50     GFR Evaluation  EGFRCR: 91 , resulted on 5/1/2023

## 2024-10-04 ENCOUNTER — TELEPHONE (OUTPATIENT)
Dept: INTERNAL MEDICINE CLINIC | Facility: CLINIC | Age: 44
End: 2024-10-04

## 2024-10-06 RX ORDER — FLUTICASONE PROPIONATE AND SALMETEROL 100; 50 UG/1; UG/1
1 POWDER RESPIRATORY (INHALATION) 2 TIMES DAILY
Qty: 180 EACH | Refills: 0 | Status: SHIPPED | OUTPATIENT
Start: 2024-10-06

## 2024-10-06 NOTE — TELEPHONE ENCOUNTER
Please review. Protocol Failed; No Protocol    MyChart message sent to patient to schedule an office visit with Provider and/or  Routed to Patient  for assistance with appointment.     Requested Prescriptions   Pending Prescriptions Disp Refills    FLUTICASONE-SALMETEROL 100-50 MCG/ACT Inhalation Aerosol Powder, Breath Activated [Pharmacy Med Name: FLUTICASONE-SALMETEROL 100-50] 180 each 1     Sig: INHALE 1 PUFF INTO THE LUNGS TWICE A DAY       Asthma & COPD Medication Protocol Failed - 10/4/2024 12:02 AM        Failed - Asthma Action Score greater than or equal to 20        Failed - Appointment in past 6 or next 3 months      Recent Outpatient Visits              10 months ago Primary hypertension    [facility]    [facility]    1 year ago Primary hypertension    [facility]    Office Visit    1 year ago Mild intermittent asthma without complication (HCC)    [facility]    Office Visit    1 year ago Mild intermittent asthma with acute exacerbation (HCC)    [facility]    Office Visit    1 year ago Acute bronchospasm    [facility], [facility], [facility] [place] [facility]    Office Visit                      Failed - AAP/ACT given in last 12 months     No data recorded  No data recorded  No data recorded  No data recorded                   Recent Outpatient Visits              [facility] months [facility]    Office Visit    1 year ago Primary hypertension    [facility]    Office Visit    1 year ago Mild intermittent asthma without complication (HCC)    [facility]    Office Visit    1 year ago Mild intermittent asthma with acute exacerbation (HCC)    [facility]    Office Visit    1 year ago Acute bronchospasm    [facility], [facility], [facility], Deisy Morse PA-C    Office Visit

## 2024-11-13 ENCOUNTER — OFFICE VISIT (OUTPATIENT)
Dept: INTERNAL MEDICINE CLINIC | Facility: CLINIC | Age: 44
End: 2024-11-13
Payer: COMMERCIAL

## 2024-11-13 VITALS
HEART RATE: 79 BPM | HEIGHT: 74 IN | OXYGEN SATURATION: 98 % | WEIGHT: 271.63 LBS | RESPIRATION RATE: 18 BRPM | SYSTOLIC BLOOD PRESSURE: 130 MMHG | DIASTOLIC BLOOD PRESSURE: 88 MMHG | BODY MASS INDEX: 34.86 KG/M2

## 2024-11-13 DIAGNOSIS — Z23 NEED FOR VACCINATION: ICD-10-CM

## 2024-11-13 DIAGNOSIS — Z12.5 PROSTATE CANCER SCREENING: ICD-10-CM

## 2024-11-13 DIAGNOSIS — J45.20 MILD INTERMITTENT ASTHMA WITHOUT COMPLICATION (HCC): Primary | ICD-10-CM

## 2024-11-13 DIAGNOSIS — Z00.00 PREVENTATIVE HEALTH CARE: ICD-10-CM

## 2024-11-13 DIAGNOSIS — I10 PRIMARY HYPERTENSION: ICD-10-CM

## 2024-11-13 NOTE — PROGRESS NOTES
Jax Jett  44 year old     .    Is here for preventative wellness exam-        Works at  [employer]  -    [employer]        Has asthma  -  on  laba - steroid  rare rescue -       ROS    GENERAL:no systemic symptoms, screening for depression negative, does not drink excessively, no drug use,    Discussed weight/BMI    LUNGS:denies shortness of breath or resp symptoms    CARDIOVASCULAR: denies chest pain     GI: Does not endorse any GI SYMPTOMS    ALL OTHER REVIEW IS NEGATIVE         Patient Active Problem List   Diagnosis    Pneumonia, organism unspecified(486)   History reviewed. No pertinent surgical history.   Family History   Problem Relation Age of Onset    Lipids Father         high cholesterol    Other (Other) Father         Chu's esophagus    Cancer Sister         Brain    Stroke Paternal Grandfather     Dementia Paternal Grandmother      Social History     Socioeconomic History    Marital status:    Tobacco Use    Smoking status: Former     Current packs/day: 0.00     Types: Cigarettes     Quit date: 6/15/2006     Years since quittin.4    Smokeless tobacco: Current    Tobacco comments:     every once in awhile use smokeless tobacco   Vaping Use    Vaping status: Never Used   Substance and Sexual Activity    Alcohol use: Yes     Alcohol/week: 7.0 standard drinks of alcohol     Types: 4 Cans of beer, 3 Standard drinks or equivalent per week     Comment: 3x per week    Drug use: No   Other Topics Concern    Caffeine Concern Yes    Stress Concern No    Weight Concern Yes    Special Diet No    Exercise Yes     Comment: daily walk with dog    Seat Belt Yes      Current Outpatient Medications:     fluticasone-salmeterol 100-50 MCG/ACT Inhalation Aerosol Powder, Breath Activated, Inhale 1 puff into the lungs 2 (two) times daily., Disp: 180 each, Rfl: 0    amLODIPine 10 MG Oral Tab, TAKE 1 TABLET BY MOUTH EVERY DAY, Disp: 90 tablet, Rfl: 3    albuterol 108 (90 Base) MCG/ACT Inhalation Aero Soln, Inhale 2  puffs into the lungs every 4 (four) hours as needed for Wheezing or Shortness of Breath., Disp: 18 g, Rfl: 3             EXAM:     Vitals:    11/13/24 1432   BP: 130/88   Pulse:    Resp:    VITALSBody mass index is 34.87 kg/m².      Wt Readings from Last 3 Encounters:   11/13/24 271 lb 9.6 oz (123.2 kg)   11/27/23 260 lb (117.9 kg)   10/14/23 255 lb (115.7 kg)           BP Readings from Last 3 Encounters:   11/13/24 130/88   11/27/23 135/85   10/14/23 139/84           Appears Healthy    Neck: normal    Lungs: clear , nl     Heart:  Reg    Abdomen:  Nondistended    Extremities:  nl     Skin:  no suspicious lesions    Neurological: nl       Diagnoses and all orders for this visit:    Mild intermittent asthma without complication (HCC)    Primary hypertension    Need for vaccination  -     FLULAVAL INFLUENZA VACCINE QUAD PRESERVATIVE FREE 0.5 ML    Preventative health care  -     FLULAVAL INFLUENZA VACCINE QUAD PRESERVATIVE FREE 0.5 ML  -     CBC With Differential With Platelet; Future  -     Comp Metabolic Panel (14); Future  -     Lipid Panel; Future  -     Hemoglobin A1C; Future  -     TSH W Reflex To Free T4; Future  -     Occult Blood, Fecal, FIT Immunoassay; Future    Prostate cancer screening  -     PSA Total, Screen; Future          Discussed importance of healthy lifestyle with exercise and diet.  Recommend to avoid tobacco  Keep alcohol use to a minimum  Discussed appropriate screening tests;   Discussed vaccines  Bp on high side  diastolic  recheck in 6 mos      This note was prepared using Dragon Medical voice recognition dictation software and as a result, errors may occur. When identified, these errors have been corrected. While every attempt is made to correct errors during dictation, discrepancies may still exist       Mario Gray MD

## 2024-11-14 ENCOUNTER — LAB ENCOUNTER (OUTPATIENT)
Dept: LAB | Facility: HOSPITAL | Age: 44
End: 2024-11-14
Attending: INTERNAL MEDICINE
Payer: COMMERCIAL

## 2024-11-14 DIAGNOSIS — Z00.00 PREVENTATIVE HEALTH CARE: ICD-10-CM

## 2024-11-14 DIAGNOSIS — Z12.5 PROSTATE CANCER SCREENING: ICD-10-CM

## 2024-11-14 LAB
ALBUMIN SERPL-MCNC: 4.6 G/DL (ref 3.2–4.8)
ALBUMIN/GLOB SERPL: 1.6 {RATIO} (ref 1–2)
ALP LIVER SERPL-CCNC: 63 U/L
ALT SERPL-CCNC: 59 U/L
ANION GAP SERPL CALC-SCNC: 7 MMOL/L (ref 0–18)
AST SERPL-CCNC: 45 U/L (ref ?–34)
BASOPHILS # BLD AUTO: 0.04 X10(3) UL (ref 0–0.2)
BASOPHILS NFR BLD AUTO: 0.6 %
BILIRUB SERPL-MCNC: 1.1 MG/DL (ref 0.3–1.2)
BUN BLD-MCNC: 12 MG/DL (ref 9–23)
BUN/CREAT SERPL: 12 (ref 10–20)
CALCIUM BLD-MCNC: 9.8 MG/DL (ref 8.7–10.4)
CHLORIDE SERPL-SCNC: 108 MMOL/L (ref 98–112)
CHOLEST SERPL-MCNC: 226 MG/DL (ref ?–200)
CO2 SERPL-SCNC: 26 MMOL/L (ref 21–32)
COMPLEXED PSA SERPL-MCNC: 0.3 NG/ML (ref ?–4)
CREAT BLD-MCNC: 1 MG/DL
DEPRECATED RDW RBC AUTO: 38.2 FL (ref 35.1–46.3)
EGFRCR SERPLBLD CKD-EPI 2021: 94 ML/MIN/1.73M2 (ref 60–?)
EOSINOPHIL # BLD AUTO: 0.28 X10(3) UL (ref 0–0.7)
EOSINOPHIL NFR BLD AUTO: 4.2 %
ERYTHROCYTE [DISTWIDTH] IN BLOOD BY AUTOMATED COUNT: 12.1 % (ref 11–15)
EST. AVERAGE GLUCOSE BLD GHB EST-MCNC: 103 MG/DL (ref 68–126)
FASTING PATIENT LIPID ANSWER: YES
FASTING STATUS PATIENT QL REPORTED: YES
GLOBULIN PLAS-MCNC: 2.8 G/DL (ref 2–3.5)
GLUCOSE BLD-MCNC: 108 MG/DL (ref 70–99)
HBA1C MFR BLD: 5.2 % (ref ?–5.7)
HCT VFR BLD AUTO: 45.7 %
HDLC SERPL-MCNC: 48 MG/DL (ref 40–59)
HEMOCCULT STL QL: NEGATIVE
HGB BLD-MCNC: 16.7 G/DL
IMM GRANULOCYTES # BLD AUTO: 0.03 X10(3) UL (ref 0–1)
IMM GRANULOCYTES NFR BLD: 0.4 %
LDLC SERPL CALC-MCNC: 132 MG/DL (ref ?–100)
LYMPHOCYTES # BLD AUTO: 1.76 X10(3) UL (ref 1–4)
LYMPHOCYTES NFR BLD AUTO: 26.4 %
MCH RBC QN AUTO: 31.5 PG (ref 26–34)
MCHC RBC AUTO-ENTMCNC: 36.5 G/DL (ref 31–37)
MCV RBC AUTO: 86.2 FL
MONOCYTES # BLD AUTO: 0.63 X10(3) UL (ref 0.1–1)
MONOCYTES NFR BLD AUTO: 9.4 %
NEUTROPHILS # BLD AUTO: 3.93 X10 (3) UL (ref 1.5–7.7)
NEUTROPHILS # BLD AUTO: 3.93 X10(3) UL (ref 1.5–7.7)
NEUTROPHILS NFR BLD AUTO: 59 %
NONHDLC SERPL-MCNC: 178 MG/DL (ref ?–130)
OSMOLALITY SERPL CALC.SUM OF ELEC: 292 MOSM/KG (ref 275–295)
PLATELET # BLD AUTO: 270 10(3)UL (ref 150–450)
POTASSIUM SERPL-SCNC: 4.1 MMOL/L (ref 3.5–5.1)
PROT SERPL-MCNC: 7.4 G/DL (ref 5.7–8.2)
RBC # BLD AUTO: 5.3 X10(6)UL
SODIUM SERPL-SCNC: 141 MMOL/L (ref 136–145)
TRIGL SERPL-MCNC: 260 MG/DL (ref 30–149)
TSI SER-ACNC: 1.25 UIU/ML (ref 0.55–4.78)
VLDLC SERPL CALC-MCNC: 48 MG/DL (ref 0–30)
WBC # BLD AUTO: 6.7 X10(3) UL (ref 4–11)

## 2024-11-14 PROCEDURE — 80061 LIPID PANEL: CPT

## 2024-11-14 PROCEDURE — 83036 HEMOGLOBIN GLYCOSYLATED A1C: CPT

## 2024-11-14 PROCEDURE — 82274 ASSAY TEST FOR BLOOD FECAL: CPT

## 2024-11-14 PROCEDURE — 80053 COMPREHEN METABOLIC PANEL: CPT

## 2024-11-14 PROCEDURE — 84443 ASSAY THYROID STIM HORMONE: CPT

## 2024-11-14 PROCEDURE — 85025 COMPLETE CBC W/AUTO DIFF WBC: CPT

## 2024-11-14 PROCEDURE — 36415 COLL VENOUS BLD VENIPUNCTURE: CPT

## 2025-01-24 RX ORDER — FLUTICASONE PROPIONATE AND SALMETEROL 100; 50 UG/1; UG/1
1 POWDER RESPIRATORY (INHALATION) 2 TIMES DAILY
Qty: 180 EACH | Refills: 0 | OUTPATIENT
Start: 2025-01-24

## 2025-03-24 ENCOUNTER — PATIENT MESSAGE (OUTPATIENT)
Age: 45
End: 2025-03-24

## 2025-03-27 NOTE — TELEPHONE ENCOUNTER
Pt called in regards previous message and wants to know if pcp will be able to refill it    Please advise

## 2025-04-03 ENCOUNTER — NURSE TRIAGE (OUTPATIENT)
Dept: INTERNAL MEDICINE CLINIC | Facility: CLINIC | Age: 45
End: 2025-04-03

## 2025-04-03 RX ORDER — ALBUTEROL SULFATE 90 UG/1
2 INHALANT RESPIRATORY (INHALATION) EVERY 4 HOURS PRN
Qty: 18 G | Refills: 3 | Status: SHIPPED | OUTPATIENT
Start: 2025-04-03

## 2025-04-03 RX ORDER — FLUTICASONE PROPIONATE AND SALMETEROL 100; 50 UG/1; UG/1
1 POWDER RESPIRATORY (INHALATION) 2 TIMES DAILY
Qty: 180 EACH | Refills: 0 | Status: SHIPPED | OUTPATIENT
Start: 2025-04-03

## 2025-04-03 NOTE — TELEPHONE ENCOUNTER
Patient is now out of both inhalers. He can not come until Monday to be seen.   Appointment given.   Future Appointments   Date Time Provider Department Center   4/7/2025  3:40 PM Mario Gray MD [id number]   5/13/2025  2:40 PM Mario Gray MD [id number]    Inhaler pended for Dr. Gray's review   Reason for Disposition   MILD difficulty breathing (e.g., minimal/no SOB at rest, SOB with walking, pulse < 100) of new-onset or worse than normal    Answer Assessment - Initial Assessment Questions  1. RESPIRATORY STATUS: \"Describe your breathing?\" (e.g., wheezing, shortness of breath, unable to speak, severe coughing)       Shortness of breath/ wheezing   2. ONSET: \"When did this breathing problem begin?\"       3 week   3. PATTERN \"Does the difficult breathing come and go, or has it been constant since it started?\"       Comes and goes   4. SEVERITY: \"How bad is your breathing?\" (e.g., mild, moderate, severe)     - MILD: No SOB at rest, mild SOB with walking, speaks normally in sentences, can lie down, no retractions, pulse < 100.     - MODERATE: SOB at rest, SOB with minimal exertion and prefers to sit, cannot lie down flat, speaks in phrases, mild retractions, audible wheezing, pulse 100-120.     - SEVERE: Very SOB at rest, speaks in single words, struggling to breathe, sitting hunched forward, retractions, pulse > 120       Mild   5. RECURRENT SYMPTOM: \"Have you had difficulty breathing before?\" If Yes, ask: \"When was the last time?\" and \"What happened that time?\"       ED 1/24   6. CARDIAC HISTORY: \"Do you have any history of heart disease?\" (e.g., heart attack, angina, bypass surgery, angioplasty)       No   7. LUNG HISTORY: \"Do you have any history of lung disease?\"  (e.g., pulmonary embolus, asthma, emphysema)      Asthma  8. CAUSE: \"What do you think is causing the breathing problem?\"       No medication   9. OTHER SYMPTOMS: \"Do you have any other symptoms? (e.g., dizziness, runny nose, cough,  chest pain, fever)      No   10. O2 SATURATION MONITOR:  \"Do you use an oxygen saturation monitor (pulse oximeter) at home?\" If Yes, ask: \"What is your reading (oxygen level) today?\" \"What is your usual oxygen saturation reading?\" (e.g., 95%)        No   11. PREGNANCY: \"Is there any chance you are pregnant?\" \"When was your last menstrual period?\"        NA  12. TRAVEL: \"Have you traveled out of the country in the last month?\" (e.g., travel history, exposures)        No    Protocols used: Breathing Difficulty-A-[place]

## 2025-04-07 ENCOUNTER — OFFICE VISIT (OUTPATIENT)
Age: 45
End: 2025-04-07
Payer: COMMERCIAL

## 2025-04-07 VITALS
HEIGHT: 74 IN | BODY MASS INDEX: 32.98 KG/M2 | HEART RATE: 89 BPM | SYSTOLIC BLOOD PRESSURE: 136 MMHG | WEIGHT: 257 LBS | DIASTOLIC BLOOD PRESSURE: 82 MMHG | OXYGEN SATURATION: 96 %

## 2025-04-07 DIAGNOSIS — E78.5 DYSLIPIDEMIA: ICD-10-CM

## 2025-04-07 DIAGNOSIS — J45.20 MILD INTERMITTENT ASTHMA WITHOUT COMPLICATION (HCC): Primary | ICD-10-CM

## 2025-04-07 DIAGNOSIS — I10 PRIMARY HYPERTENSION: ICD-10-CM

## 2025-04-07 PROCEDURE — 99214 OFFICE O/P EST MOD 30 MIN: CPT | Performed by: INTERNAL MEDICINE

## 2025-04-07 RX ORDER — PREDNISONE 20 MG/1
40 TABLET ORAL DAILY
Qty: 14 TABLET | Refills: 1 | Status: SHIPPED | OUTPATIENT
Start: 2025-04-07 | End: 2025-04-21

## 2025-04-07 NOTE — PROGRESS NOTES
Jax Jett male 44 year old    History of Present Illness  Jax Jett is a 44 year old male with asthma and hypertension who presents for a follow-up visit.    Asthma symptoms have increased in frequency over the past couple of weeks. He experiences significant improvement when using his steroid inhaler twice daily, which reduces the need for his rescue inhaler. He ran out of his steroid inhaler prescription around mid-March, leading to increased use of his rescue inhaler. Typically, he uses the rescue inhaler more when not on the steroid inhaler, but when on it, he uses the rescue inhaler infrequently, often triggered by physical exertion such as playing basketball. He has a history of using albuterol as needed and has gone through one inhaler since his last visit. His use is described as 'spotty,' sometimes needing it every four hours in a day, and other times not at all. He has an electronic peak flow meter to monitor his lung function.    Hypertension is currently managed with amlodipine 10 mg. Home blood pressure readings are typically in the low 150s to upper 140s, which is higher than desired. He has not brought his home blood pressure machine in for calibration. He mentions that his blood pressure readings at home are usually higher than those taken in the office.    He does not currently have prednisone at home but has used it in the past for asthma exacerbations. He does not have an EpiPen for his tree nut allergy, as his reaction is limited to vomiting.             Patient Active Problem List   Diagnosis    Pneumonia, organism unspecified(486)        Current Outpatient Medications   Medication Sig Dispense Refill    predniSONE 20 MG Oral Tab Take 2 tablets (40 mg total) by mouth daily for 14 days. 14 tablet 1    albuterol 108 (90 Base) MCG/ACT Inhalation Aero Soln Inhale 2 puffs into the lungs every 4 (four) hours as needed for Wheezing or Shortness of Breath. 18 g 3     fluticasone-salmeterol 100-50 MCG/ACT Inhalation Aerosol Powder, Breath Activated Inhale 1 puff into the lungs 2 (two) times daily. 180 each 0    amLODIPine 10 MG Oral Tab TAKE 1 TABLET BY MOUTH EVERY DAY 90 tablet 3        Vitals:    04/07/25 1555   BP: 136/82   Pulse:    VITALSBody mass index is 33 kg/m².    Physical Exam  VITALS: BP- 138/  CHEST: Clear to auscultation on the right. No wheezes, rhonchi, or crackles.           Jax was seen today for wheezing.    Diagnoses and all orders for this visit:    Mild intermittent asthma without complication (HCC)    Primary hypertension    Dyslipidemia    Other orders  -     predniSONE 20 MG Oral Tab; Take 2 tablets (40 mg total) by mouth daily for 14 days.       Assessment & Plan  Asthma  Increased frequency of asthma symptoms over the last few weeks, particularly when not using the steroid inhaler regularly. Symptoms are well-controlled with twice-daily inhaler use. Experiences wheezing during physical exertion, such as playing basketball. Uses a peak flow meter to monitor symptoms and has increased rescue inhaler use when the steroid inhaler prescription ran out. Discussed the importance of regular steroid inhaler use to prevent flare-ups and the potential need for prednisone during exacerbations. Prednisone is recommended if the rescue inhaler is needed frequently, such as every four hours, to prevent worsening and potential ER visits.  - Prescribe prednisone 20 mg tablets, take 2 tablets daily for 5-7 days as needed for exacerbations  - Ensure availability of a rescue inhaler  - Encourage regular use of the steroid inhaler to prevent symptoms  - Monitor asthma symptoms with a peak flow meter    Hypertension  Blood pressure is elevated today and typically runs in the low 150s to upper 140s at home. Currently on amlodipine 10 mg daily. Discussed the possibility of adding another antihypertensive medication but decided to wait until the home blood pressure monitor is  calibrated in the office. Blood pressure readings are usually higher at home, which is atypical.  - Bring home blood pressure monitor to the office for calibration  - Monitor blood pressure readings at home  - Consider adjusting antihypertensive medication based on calibrated readings    Hyperlipidemia  Cholesterol levels were slightly elevated in the last blood work. Discussed the potential benefits and risks of starting a cholesterol-lowering medication, considering age and risk factors. Recommended a heart scan to assess the presence of coronary artery disease and guide the decision on starting medication. Explained that cholesterol medications can lower the risk of heart attack by about 25%. Emphasized that the heart scan is a screening test to detect calcium in coronary arteries, which indicates atherosclerosis. If calcium is present, it suggests the process has started, warranting further discussion on treatment.  - Order heart scan to assess coronary artery calcium  - Discuss results and potential need for cholesterol medication at follow-up  - Encourage dietary modifications, including reducing saturated fats and carbohydrates, and following a Mediterranean diet             This note was prepared using Dragon Medical voice recognition dictation software and as a result, errors may occur. When identified, these errors have been corrected. While every attempt is made to correct errors during dictation, discrepancies may still exist

## 2025-04-07 NOTE — PROGRESS NOTES
The following individual(s) verbally consented to be recorded using ambient AI listening technology and understand that they can each withdraw their consent to this listening technology at any point by asking the clinician to turn off or pause the recording: yes    Patient name: Jax Jett  Additional names:

## 2025-08-27 RX ORDER — FLUTICASONE PROPIONATE AND SALMETEROL 100; 50 UG/1; UG/1
1 POWDER RESPIRATORY (INHALATION) 2 TIMES DAILY
Qty: 180 EACH | Refills: 3 | Status: SHIPPED | OUTPATIENT
Start: 2025-08-27